# Patient Record
Sex: MALE | Race: WHITE | NOT HISPANIC OR LATINO | RURAL
[De-identification: names, ages, dates, MRNs, and addresses within clinical notes are randomized per-mention and may not be internally consistent; named-entity substitution may affect disease eponyms.]

---

## 2018-03-28 ENCOUNTER — OTHER- (OUTPATIENT)
Dept: RURAL CLINIC 1 | Facility: CLINIC | Age: 68
Setting detail: DERMATOLOGY
End: 2018-03-28

## 2018-03-28 DIAGNOSIS — L57.0 ACTINIC KERATOSIS: ICD-10-CM

## 2018-03-28 PROBLEM — D485 238.2: Status: ACTIVE | Noted: 2018-03-28

## 2018-03-28 PROCEDURE — 11100 BX SKIN SUBCUTANEOUS&/MUCOUS MEMBRANE 1 LESION: CPT

## 2018-03-28 PROCEDURE — 11101 BIOPSY SKIN SUBQ&/MUCOUS MEMBRANE EA ADDL LESN: CPT

## 2018-03-28 PROCEDURE — 99202 OFFICE O/P NEW SF 15 MIN: CPT

## 2018-05-07 ENCOUNTER — MOHS SURGERY-ROUTINE (OUTPATIENT)
Dept: URBAN - METROPOLITAN AREA CLINIC 11 | Facility: CLINIC | Age: 68
Setting detail: DERMATOLOGY
End: 2018-05-07

## 2018-05-07 DIAGNOSIS — Z48.02 ENCOUNTER FOR REMOVAL OF SUTURES: ICD-10-CM

## 2018-05-07 DIAGNOSIS — L66.1 LICHEN PLANOPILARIS: ICD-10-CM

## 2018-05-07 PROCEDURE — 11403 EXC TR-EXT B9+MARG 2.1-3CM: CPT

## 2018-05-07 PROCEDURE — 17311 MOHS 1 STAGE H/N/HF/G: CPT

## 2018-05-07 PROCEDURE — 17312 MOHS ADDL STAGE: CPT

## 2018-05-07 PROCEDURE — 12052 INTMD RPR FACE/MM 2.6-5.0 CM: CPT

## 2018-05-07 PROCEDURE — 12032 INTMD RPR S/A/T/EXT 2.6-7.5: CPT

## 2018-05-22 ENCOUNTER — MOHS SURGERY - FOLLOW UP (OUTPATIENT)
Dept: URBAN - METROPOLITAN AREA CLINIC 11 | Facility: CLINIC | Age: 68
Setting detail: DERMATOLOGY
End: 2018-05-22

## 2018-05-22 DIAGNOSIS — C44.01 BASAL CELL CARCINOMA OF SKIN OF LIP: ICD-10-CM

## 2018-05-22 PROCEDURE — 99024 POSTOP FOLLOW-UP VISIT: CPT

## 2018-08-15 ENCOUNTER — SKIN CHECK (OUTPATIENT)
Dept: RURAL CLINIC 1 | Facility: CLINIC | Age: 68
Setting detail: DERMATOLOGY
End: 2018-08-15

## 2018-08-15 DIAGNOSIS — L85.8 OTHER SPECIFIED EPIDERMAL THICKENING: ICD-10-CM

## 2018-08-15 PROCEDURE — 99213 OFFICE O/P EST LOW 20 MIN: CPT

## 2019-02-07 ENCOUNTER — OTHER- (OUTPATIENT)
Dept: RURAL CLINIC 1 | Facility: CLINIC | Age: 69
Setting detail: DERMATOLOGY
End: 2019-02-07

## 2019-02-07 DIAGNOSIS — D04.39 CARCINOMA IN SITU OF SKIN OF OTHER PARTS OF FACE: ICD-10-CM

## 2019-02-07 PROCEDURE — 17000 DESTRUCT PREMALG LESION: CPT

## 2019-08-22 ENCOUNTER — SKIN CHECK (OUTPATIENT)
Dept: RURAL CLINIC 1 | Facility: CLINIC | Age: 69
Setting detail: DERMATOLOGY
End: 2019-08-22

## 2019-08-22 DIAGNOSIS — L57.8 OTHER SKIN CHANGES DUE TO CHRONIC EXPOSURE TO NONIONIZING RADIATION: ICD-10-CM

## 2019-08-22 DIAGNOSIS — Z85.828 PERSONAL HISTORY OF OTHER MALIGNANT NEOPLASM OF SKIN: ICD-10-CM

## 2019-08-22 PROCEDURE — 99214 OFFICE O/P EST MOD 30 MIN: CPT

## 2020-09-03 ENCOUNTER — SKIN CHECK (OUTPATIENT)
Dept: RURAL CLINIC 1 | Facility: CLINIC | Age: 70
Setting detail: DERMATOLOGY
End: 2020-09-03

## 2020-09-03 DIAGNOSIS — R20.8 OTHER DISTURBANCES OF SKIN SENSATION: ICD-10-CM

## 2020-09-03 DIAGNOSIS — L81.4 OTHER MELANIN HYPERPIGMENTATION: ICD-10-CM

## 2020-09-03 DIAGNOSIS — Z85.820 PERSONAL HISTORY OF MALIGNANT MELANOMA OF SKIN: ICD-10-CM

## 2020-09-03 DIAGNOSIS — L82.1 OTHER SEBORRHEIC KERATOSIS: ICD-10-CM

## 2020-09-03 PROCEDURE — 99214 OFFICE O/P EST MOD 30 MIN: CPT

## 2020-09-03 PROCEDURE — 11102 TANGNTL BX SKIN SINGLE LES: CPT

## 2020-10-12 ENCOUNTER — EXCISION (OUTPATIENT)
Dept: RURAL CLINIC 1 | Facility: CLINIC | Age: 70
Setting detail: DERMATOLOGY
End: 2020-10-12

## 2020-10-12 DIAGNOSIS — Z08 ENCOUNTER FOR FOLLOW-UP EXAMINATION AFTER COMPLETED TREATMENT FOR MALIGNANT NEOPLASM: ICD-10-CM

## 2020-10-12 PROCEDURE — 11402 EXC TR-EXT B9+MARG 1.1-2 CM: CPT

## 2020-10-12 PROCEDURE — 12032 INTMD RPR S/A/T/EXT 2.6-7.5: CPT

## 2020-10-19 ENCOUNTER — SUTURE REMOVAL (OUTPATIENT)
Dept: RURAL CLINIC 1 | Facility: CLINIC | Age: 70
Setting detail: DERMATOLOGY
End: 2020-10-19

## 2020-10-19 DIAGNOSIS — L57.0 ACTINIC KERATOSIS: ICD-10-CM

## 2020-10-19 PROCEDURE — 99024 POSTOP FOLLOW-UP VISIT: CPT

## 2021-09-22 ENCOUNTER — RX ONLY (RX ONLY)
Age: 71
End: 2021-09-22

## 2021-09-22 ENCOUNTER — SKIN CHECK (OUTPATIENT)
Dept: RURAL CLINIC 2 | Facility: CLINIC | Age: 71
Setting detail: DERMATOLOGY
End: 2021-09-22

## 2021-09-22 DIAGNOSIS — D48.5 NEOPLASM OF UNCERTAIN BEHAVIOR OF SKIN: ICD-10-CM

## 2021-09-22 PROCEDURE — 99214 OFFICE O/P EST MOD 30 MIN: CPT

## 2021-09-22 RX ORDER — FLUOROURACIL 2 G/40G
AS DIRECTED CREAM TOPICAL TWICE A DAY
Qty: 40 | Refills: 0
Start: 2021-09-22

## 2022-10-06 ENCOUNTER — APPOINTMENT (OUTPATIENT)
Dept: RURAL CLINIC 9 | Age: 72
Setting detail: DERMATOLOGY
End: 2022-10-06

## 2022-10-06 DIAGNOSIS — L57.8 OTHER SKIN CHANGES DUE TO CHRONIC EXPOSURE TO NONIONIZING RADIATION: ICD-10-CM

## 2022-10-06 DIAGNOSIS — Z85.828 PERSONAL HISTORY OF OTHER MALIGNANT NEOPLASM OF SKIN: ICD-10-CM

## 2022-10-06 DIAGNOSIS — D18.0 HEMANGIOMA: ICD-10-CM

## 2022-10-06 DIAGNOSIS — L82.1 OTHER SEBORRHEIC KERATOSIS: ICD-10-CM

## 2022-10-06 DIAGNOSIS — L81.2 FRECKLES: ICD-10-CM

## 2022-10-06 DIAGNOSIS — L71.8 OTHER ROSACEA: ICD-10-CM

## 2022-10-06 DIAGNOSIS — D22 MELANOCYTIC NEVI: ICD-10-CM

## 2022-10-06 PROBLEM — D18.01 HEMANGIOMA OF SKIN AND SUBCUTANEOUS TISSUE: Status: ACTIVE | Noted: 2022-10-06

## 2022-10-06 PROBLEM — D22.39 MELANOCYTIC NEVI OF OTHER PARTS OF FACE: Status: ACTIVE | Noted: 2022-10-06

## 2022-10-06 PROCEDURE — OTHER MIPS QUALITY: OTHER

## 2022-10-06 PROCEDURE — 99213 OFFICE O/P EST LOW 20 MIN: CPT

## 2022-10-06 PROCEDURE — OTHER COUNSELING: OTHER

## 2022-10-06 ASSESSMENT — LOCATION DETAILED DESCRIPTION DERM
LOCATION DETAILED: LEFT INFERIOR CRUS OF ANTIHELIX
LOCATION DETAILED: RIGHT SUPERIOR CRUS OF ANTIHELIX
LOCATION DETAILED: RIGHT INFERIOR CENTRAL MALAR CHEEK
LOCATION DETAILED: NASAL DORSUM
LOCATION DETAILED: LEFT SUPERIOR LATERAL BUCCAL CHEEK
LOCATION DETAILED: PERIUMBILICAL SKIN
LOCATION DETAILED: RIGHT SUPERIOR PREAURICULAR CHEEK
LOCATION DETAILED: LEFT INFERIOR LATERAL MALAR CHEEK
LOCATION DETAILED: LEFT MEDIAL SUPERIOR CHEST
LOCATION DETAILED: RIGHT INFERIOR POSTERIOR NECK
LOCATION DETAILED: LEFT INFERIOR CENTRAL MALAR CHEEK

## 2022-10-06 ASSESSMENT — LOCATION ZONE DERM
LOCATION ZONE: FACE
LOCATION ZONE: EAR
LOCATION ZONE: NECK
LOCATION ZONE: TRUNK
LOCATION ZONE: NOSE

## 2022-10-06 ASSESSMENT — LOCATION SIMPLE DESCRIPTION DERM
LOCATION SIMPLE: ABDOMEN
LOCATION SIMPLE: RIGHT EAR
LOCATION SIMPLE: POSTERIOR NECK
LOCATION SIMPLE: LEFT CHEEK
LOCATION SIMPLE: LEFT EAR
LOCATION SIMPLE: RIGHT CHEEK
LOCATION SIMPLE: CHEST
LOCATION SIMPLE: NOSE

## 2022-10-06 NOTE — HPI: SECONDARY COMPLAINT
Additional History: Patient reports treating with Efudex Cream bid x 2 weeks in the fall of 2021; saw good reaction.

## 2023-12-14 ENCOUNTER — APPOINTMENT (OUTPATIENT)
Dept: RURAL CLINIC 9 | Age: 73
Setting detail: DERMATOLOGY
End: 2023-12-16

## 2023-12-14 DIAGNOSIS — L57.0 ACTINIC KERATOSIS: ICD-10-CM

## 2023-12-14 DIAGNOSIS — L82.1 OTHER SEBORRHEIC KERATOSIS: ICD-10-CM

## 2023-12-14 DIAGNOSIS — D18.0 HEMANGIOMA: ICD-10-CM

## 2023-12-14 DIAGNOSIS — Z87.2 PERSONAL HISTORY OF DISEASES OF THE SKIN AND SUBCUTANEOUS TISSUE: ICD-10-CM

## 2023-12-14 DIAGNOSIS — Z85.828 PERSONAL HISTORY OF OTHER MALIGNANT NEOPLASM OF SKIN: ICD-10-CM

## 2023-12-14 DIAGNOSIS — D22 MELANOCYTIC NEVI: ICD-10-CM

## 2023-12-14 DIAGNOSIS — Z71.89 OTHER SPECIFIED COUNSELING: ICD-10-CM

## 2023-12-14 PROBLEM — D18.01 HEMANGIOMA OF SKIN AND SUBCUTANEOUS TISSUE: Status: ACTIVE | Noted: 2023-12-14

## 2023-12-14 PROBLEM — D22.5 MELANOCYTIC NEVI OF TRUNK: Status: ACTIVE | Noted: 2023-12-14

## 2023-12-14 PROCEDURE — OTHER LIQUID NITROGEN: OTHER

## 2023-12-14 PROCEDURE — OTHER COUNSELING: OTHER

## 2023-12-14 PROCEDURE — 99213 OFFICE O/P EST LOW 20 MIN: CPT | Mod: 25

## 2023-12-14 PROCEDURE — 17000 DESTRUCT PREMALG LESION: CPT

## 2023-12-14 PROCEDURE — OTHER MIPS QUALITY: OTHER

## 2023-12-14 ASSESSMENT — LOCATION SIMPLE DESCRIPTION DERM
LOCATION SIMPLE: RIGHT CHEEK
LOCATION SIMPLE: LEFT UPPER BACK
LOCATION SIMPLE: ABDOMEN
LOCATION SIMPLE: LEFT TEMPLE
LOCATION SIMPLE: UPPER BACK
LOCATION SIMPLE: CHEST

## 2023-12-14 ASSESSMENT — LOCATION DETAILED DESCRIPTION DERM
LOCATION DETAILED: LEFT MEDIAL INFERIOR CHEST
LOCATION DETAILED: RIGHT SUPERIOR LATERAL MALAR CHEEK
LOCATION DETAILED: LEFT CENTRAL TEMPLE
LOCATION DETAILED: EPIGASTRIC SKIN
LOCATION DETAILED: INFERIOR THORACIC SPINE
LOCATION DETAILED: LEFT MEDIAL UPPER BACK
LOCATION DETAILED: LEFT SUPERIOR UPPER BACK

## 2023-12-14 ASSESSMENT — LOCATION ZONE DERM
LOCATION ZONE: TRUNK
LOCATION ZONE: FACE

## 2023-12-14 NOTE — PROCEDURE: LIQUID NITROGEN
Number Of Freeze-Thaw Cycles: 1 freeze-thaw cycle
Duration Of Freeze Thaw-Cycle (Seconds): 5
Post-Care Instructions: I reviewed with the patient in detail post-care instructions. Patient is to wear sun protection, and avoid picking at any of the treated lesions. Pt is to clean area with hydrogen peroxide twice daily until healed.
Render Post-Care Instructions In Note?: no
Application Tool (Optional): Liquid Nitrogen Sprayer
Show Aperture Variable?: Yes
Consent: The patient's consent was obtained including but not limited to risks of crusting, scabbing, blistering, scarring, darker or lighter pigmentary change, recurrence, incomplete removal and infection.
Detail Level: Detailed

## 2023-12-14 NOTE — PROCEDURE: MIPS QUALITY
Quality 431: Preventive Care And Screening: Unhealthy Alcohol Use - Screening: Patient not identified as an unhealthy alcohol user when screened for unhealthy alcohol use using a systematic screening method
Quality 47: Advance Care Plan: Advance Care Planning discussed and documented in the medical record; patient did not wish or was not able to name a surrogate decision maker or provide an advance care plan.
Quality 110: Preventive Care And Screening: Influenza Immunization: Influenza Immunization Administered during Influenza season
Quality 226: Preventive Care And Screening: Tobacco Use: Screening And Cessation Intervention: Patient screened for tobacco use and is an ex/non-smoker
Detail Level: Detailed
Quality 111:Pneumonia Vaccination Status For Older Adults: Patient did not receive any pneumococcal conjugate or polysaccharide vaccine on or after their 60th birthday and before the end of the measurement period
Quality 130: Documentation Of Current Medications In The Medical Record: Current Medications Documented

## 2025-01-15 ENCOUNTER — APPOINTMENT (OUTPATIENT)
Dept: RURAL CLINIC 9 | Age: 75
Setting detail: DERMATOLOGY
End: 2025-01-15

## 2025-01-15 DIAGNOSIS — L81.4 OTHER MELANIN HYPERPIGMENTATION: ICD-10-CM

## 2025-01-15 DIAGNOSIS — L82.1 OTHER SEBORRHEIC KERATOSIS: ICD-10-CM

## 2025-01-15 DIAGNOSIS — Z85.828 PERSONAL HISTORY OF OTHER MALIGNANT NEOPLASM OF SKIN: ICD-10-CM

## 2025-01-15 DIAGNOSIS — Z87.2 PERSONAL HISTORY OF DISEASES OF THE SKIN AND SUBCUTANEOUS TISSUE: ICD-10-CM

## 2025-01-15 DIAGNOSIS — Z71.89 OTHER SPECIFIED COUNSELING: ICD-10-CM

## 2025-01-15 DIAGNOSIS — D22 MELANOCYTIC NEVI: ICD-10-CM

## 2025-01-15 DIAGNOSIS — I78.8 OTHER DISEASES OF CAPILLARIES: ICD-10-CM

## 2025-01-15 DIAGNOSIS — D18.0 HEMANGIOMA: ICD-10-CM

## 2025-01-15 PROBLEM — D22.39 MELANOCYTIC NEVI OF OTHER PARTS OF FACE: Status: ACTIVE | Noted: 2025-01-15

## 2025-01-15 PROBLEM — D18.01 HEMANGIOMA OF SKIN AND SUBCUTANEOUS TISSUE: Status: ACTIVE | Noted: 2025-01-15

## 2025-01-15 PROCEDURE — 99213 OFFICE O/P EST LOW 20 MIN: CPT

## 2025-01-15 PROCEDURE — OTHER MIPS QUALITY: OTHER

## 2025-01-15 PROCEDURE — OTHER COUNSELING: OTHER

## 2025-01-15 ASSESSMENT — LOCATION DETAILED DESCRIPTION DERM
LOCATION DETAILED: LEFT CENTRAL PARIETAL SCALP
LOCATION DETAILED: RIGHT CENTRAL MALAR CHEEK
LOCATION DETAILED: RIGHT SUPERIOR PREAURICULAR CHEEK
LOCATION DETAILED: RIGHT CENTRAL PARIETAL SCALP
LOCATION DETAILED: LEFT CENTRAL MALAR CHEEK
LOCATION DETAILED: EPIGASTRIC SKIN
LOCATION DETAILED: LEFT SUPERIOR MEDIAL UPPER BACK
LOCATION DETAILED: LEFT SUPERIOR UPPER BACK

## 2025-01-15 ASSESSMENT — LOCATION SIMPLE DESCRIPTION DERM
LOCATION SIMPLE: SCALP
LOCATION SIMPLE: LEFT CHEEK
LOCATION SIMPLE: ABDOMEN
LOCATION SIMPLE: RIGHT CHEEK
LOCATION SIMPLE: LEFT UPPER BACK

## 2025-01-15 ASSESSMENT — LOCATION ZONE DERM
LOCATION ZONE: TRUNK
LOCATION ZONE: SCALP
LOCATION ZONE: FACE